# Patient Record
Sex: FEMALE | Race: WHITE | NOT HISPANIC OR LATINO | Employment: OTHER | URBAN - METROPOLITAN AREA
[De-identification: names, ages, dates, MRNs, and addresses within clinical notes are randomized per-mention and may not be internally consistent; named-entity substitution may affect disease eponyms.]

---

## 2017-12-23 ENCOUNTER — HOSPITAL ENCOUNTER (EMERGENCY)
Facility: CLINIC | Age: 57
Discharge: HOME | End: 2017-12-23
Attending: EMERGENCY MEDICINE | Admitting: EMERGENCY MEDICINE
Payer: COMMERCIAL

## 2017-12-23 VITALS
TEMPERATURE: 99 F | OXYGEN SATURATION: 99 % | BODY MASS INDEX: 29.26 KG/M2 | SYSTOLIC BLOOD PRESSURE: 148 MMHG | RESPIRATION RATE: 16 BRPM | WEIGHT: 160 LBS | DIASTOLIC BLOOD PRESSURE: 106 MMHG

## 2017-12-23 DIAGNOSIS — G56.03 BILATERAL CARPAL TUNNEL SYNDROME: ICD-10-CM

## 2017-12-23 PROCEDURE — 99283 EMERGENCY DEPT VISIT LOW MDM: CPT

## 2017-12-23 RX ORDER — METHYLPREDNISOLONE 4 MG
TABLET, DOSE PACK ORAL
Qty: 21 TABLET | Refills: 0 | Status: SHIPPED | OUTPATIENT
Start: 2017-12-23

## 2017-12-23 RX ORDER — GABAPENTIN 300 MG/1
300 CAPSULE ORAL AT BEDTIME
Qty: 15 CAPSULE | Refills: 0 | Status: SHIPPED | OUTPATIENT
Start: 2017-12-23

## 2017-12-23 ASSESSMENT — ENCOUNTER SYMPTOMS
MYALGIAS: 1
FEVER: 0

## 2017-12-23 NOTE — ED AVS SNAPSHOT
[facility] Emergency Department    [address]    [facility] [address]    Phone:  [phone]    Fax:  [phone]                                       Estella Yeager   MRN: [id number]    Department:  [facility] Emergency Department   Date of Visit:  12/23/2017           After Visit Summary Signature Page     I have received my discharge instructions, and my questions have been answered. I have discussed any challenges I see with this plan with the nurse or doctor.    ..........................................................................................................................................  Patient/Patient Representative Signature      ..........................................................................................................................................  Patient Representative Print Name and Relationship to Patient    ..................................................               ................................................  Date                                            Time    ..........................................................................................................................................  Reviewed by Signature/Title    ...................................................              ..............................................  Date                                                            Time

## 2017-12-23 NOTE — ED AVS SNAPSHOT
[facility] Emergency Department    [address] [place] [address]    Phone:  [phone]    Fax:  [phone]                                       Estella Yeager   MRN: [id number]    Department:  [facility] Emergency Department   Date of Visit:  12/23/2017           Patient Information     Date Of Birth          1960        Your diagnoses for this visit were:     Bilateral carpal tunnel syndrome        You were seen by Ron Casetllano MD.      Follow-up Information     Follow up with [facility], [place] In 3 days.    Specialty:  Family Practice        Discharge Instructions         Carpal Tunnel Syndrome Prevention Tips  Some repetitive hand activities put you at higher risk for carpal tunnel syndrome (CTS). But you can reduce your risk. Learn how to change the way you use your hands. Below are tips for at home and on the job. Be sure to also follow the hand and wrist safety policies at your workplace.      Keep your wrist in a neutral (straight) position when exercising.      Keep your wrist in neutral  Keep a neutral (straight) wrist position as often as you can. Don t use your wrist in a bent (flexed) position for long periods of time. This includes extended or twisted positions.  Watch your   Don t just use your thumb and index finger to grasp or lift. This can put stress on your wrist. When you can, use your whole hand and all its fingers to grasp an object.  Minimize repetition  Don t move your arms or hands or hold an object in the same way for long periods of time. Even simple, light tasks can cause injury this way. Instead, alternate tasks or switch hands.  Rest your hands  Give your hands a break from time to time with a rest. Even a few minutes once an hour can help.  Reduce speed and force  Slow down the speed in which you do a forceful, repetitive motion. This gives your wrist time to recover from the effort. Use power tools to help reduce the  force.  Strengthen the muscles  Weak muscles may lead to a poor wrist or arm position. Exercises will make your hand and arm muscles stronger. This can help you keep a better position.  Date Last Reviewed: 9/11/2015 2000-2017 [employer]. [address] [place] [address], [place] [address]. All rights reserved. This information is not intended as a substitute for professional medical care. Always follow your healthcare professional's instructions.          24 Hour Appointment Hotline       To make an appointment at any [facility], call [phone]). If you don't have a family doctor or clinic, we will help you find one. [facility] are conveniently located to serve the needs of you and your family.             Review of your medicines      START taking        Dose / Directions Last dose taken    gabapentin 300 MG capsule   Commonly known as:  NEURONTIN   Dose:  300 mg   Quantity:  15 capsule        Take 1 capsule (300 mg) by mouth At Bedtime   Refills:  0        methylPREDNISolone 4 MG tablet   Commonly known as:  MEDROL DOSEPAK   Quantity:  21 tablet        Follow package instructions   Refills:  0          Our records show that you are taking the medicines listed below. If these are incorrect, please call your family doctor or clinic.        Dose / Directions Last dose taken    CYMBALTA 60 MG EC capsule   Dose:  60 mg   Generic drug:  DULoxetine        Take 60 mg by mouth daily.   Refills:  0        DICLOFENAC SODIUM PO   Dose:  75 mg        Take 75 mg by mouth   Refills:  0        NORTRIPTYLINE HCL PO   Dose:  75 mg        Take 75 mg by mouth   Refills:  0                Prescriptions were sent or printed at these locations (2 Prescriptions)                   Other Prescriptions                Printed at Department/Unit printer (2 of 2)         gabapentin (NEURONTIN) 300 MG capsule               methylPREDNISolone (MEDROL DOSEPAK) 4 MG tablet                Orders Needing  Specimen Collection     None      Pending Results     No orders found from 12/21/2017 to 12/24/2017.            Pending Culture Results     No orders found from 12/21/2017 to 12/24/2017.            Pending Results Instructions     If you had any lab results that were not finalized at the time of your Discharge, you can call the ED Lab Result RN at [phone]. You will be contacted by this team for any positive Lab results or changes in treatment. The nurses are available 7 days a week from 10A to 6:30P.  You can leave a message 24 hours per day and they will return your call.        Test Results From Your Hospital Stay               Clinical Quality Measure: Blood Pressure Screening     Your blood pressure was checked while you were in the emergency department today. The last reading we obtained was  BP: (!) 148/106 . Please read the guidelines below about what these numbers mean and what you should do about them.  If your systolic blood pressure (the top number) is less than 120 and your diastolic blood pressure (the bottom number) is less than 80, then your blood pressure is normal. There is nothing more that you need to do about it.  If your systolic blood pressure (the top number) is 120-139 or your diastolic blood pressure (the bottom number) is 80-89, your blood pressure may be higher than it should be. You should have your blood pressure rechecked within a year by a primary care provider.  If your systolic blood pressure (the top number) is 140 or greater or your diastolic blood pressure (the bottom number) is 90 or greater, you may have high blood pressure. High blood pressure is treatable, but if left untreated over time it can put you at risk for heart attack, stroke, or kidney failure. You should have your blood pressure rechecked by a primary care provider within the next 4 weeks.  If your provider in the emergency department today gave you specific instructions to follow-up with your doctor or  "provider even sooner than that, you should follow that instruction and not wait for up to 4 weeks for your follow-up visit.        Thank you for choosing [facility]       Thank you for choosing [facility] for your care. Our goal is always to provide you with excellent care. Hearing back from our patients is one way we can continue to improve our services. Please take a few minutes to complete the written survey that you may receive in the mail after you visit with us. Thank you!        [employer] Information     [employer] lets you send messages to your doctor, view your test results, renew your prescriptions, schedule appointments and more. To sign up, go to [email or web address] . Click on \"Log in\" on the left side of the screen, which will take you to the Welcome page. Then click on \"Sign up Now\" on the right side of the page.     You will be asked to enter the access code listed below, as well as some personal information. Please follow the directions to create your username and password.     Your access code is: [email or web address]  Expires: 3/23/2018  6:30 PM     Your access code will  in 90 days. If you need help or a new code, please call your [facility] clinic or [phone].        Care EveryWhere ID     This is your Care EveryWhere ID. This could be used by other organizations to access your [facility] medical records  [id number]        Equal Access to Services     GRISEL FARLEY AH: Hadii cb Blankenship, waaxda lutremaineadaha, qaybta kaalmaarline mansfield, adilene carson. So [facility] [phone].    ATENCIÓN: Si habla español, tiene a mcfadden disposición servicios gratuitos de asistencia lingüística. Tc al [phone].    We comply with applicable federal civil rights laws and [place] laws. We do not discriminate on the basis of race, color, national origin, age, disability, sex, sexual orientation, or gender identity.            After Visit Summary       This is your record. Keep this with " you and show to your community pharmacist(s) and doctor(s) at your next visit.

## 2017-12-23 NOTE — ED NOTES
Pt presents with pain in bot hands 10/10 no mechanism of injury chronic pain however increased to 10/10 today

## 2017-12-24 NOTE — DISCHARGE INSTRUCTIONS
Carpal Tunnel Syndrome Prevention Tips  Some repetitive hand activities put you at higher risk for carpal tunnel syndrome (CTS). But you can reduce your risk. Learn how to change the way you use your hands. Below are tips for at home and on the job. Be sure to also follow the hand and wrist safety policies at your workplace.      Keep your wrist in a neutral (straight) position when exercising.      Keep your wrist in neutral  Keep a neutral (straight) wrist position as often as you can. Don t use your wrist in a bent (flexed) position for long periods of time. This includes extended or twisted positions.  Watch your   Don t just use your thumb and index finger to grasp or lift. This can put stress on your wrist. When you can, use your whole hand and all its fingers to grasp an object.  Minimize repetition  Don t move your arms or hands or hold an object in the same way for long periods of time. Even simple, light tasks can cause injury this way. Instead, alternate tasks or switch hands.  Rest your hands  Give your hands a break from time to time with a rest. Even a few minutes once an hour can help.  Reduce speed and force  Slow down the speed in which you do a forceful, repetitive motion. This gives your wrist time to recover from the effort. Use power tools to help reduce the force.  Strengthen the muscles  Weak muscles may lead to a poor wrist or arm position. Exercises will make your hand and arm muscles stronger. This can help you keep a better position.  Date Last Reviewed: 9/11/2015 2000-2017 [employer]. [address] [place] [address], [place] [address]. All rights reserved. This information is not intended as a substitute for professional medical care. Always follow your healthcare professional's instructions.

## 2017-12-24 NOTE — ED PROVIDER NOTES
History     Chief Complaint:  Hand Pain     HPI   Estella Yeager is a 57 year old female who presents to the emergency department today with hand pain. The patient has chronic pain that has been going on for several months and increased to 10/10 pain on both hands today. The pain goes through her palms and was described as sharp, even burning and they feel worse at night and she does wear braces. The pain does not get worse when she moves them. She was moving this weekend, which she believes has aggravated her hands today.The patient reports she has been tested for several related diseases, which could be responsible for hand pain, had a lot of hand therapy, and nothing is really working to ease her pain. The patient had carpel tunnel release surgery on both hands. The patient denies fever.     Allergies:  No Known Drug Allergies     Medications:    Cymbalta  Nortriptyline  Diclofenac sodium    Past Medical History:    Carpal Tunnel Syndrome  Depression  Self mutilating behavior    Past Surgical History:    Carpal tunnel release     Family History:    Cardiovascular    Social History:  The patient was accompanied to the ED by grandson.  Smoking Status:Never  Smokeless Tobacco: Never  Alcohol Use: No   Marital Status:  Single    Review of Systems   Constitutional: Negative for fever.   Musculoskeletal: Positive for myalgias (bilateral hand pain).   All other systems reviewed and are negative.    Physical Exam     Patient Vitals for the past 24 hrs:   BP Temp Temp src Heart Rate Resp SpO2 Weight   12/23/17 1801 - - - - - - 72.6 kg (160 lb)   12/23/17 1756 (!) 148/106 99  F (37.2  C) Oral 83 16 99 % -      Physical Exam    General: On examination, she appears happy and engaged.  HEENT:   The scalp and head appear normal    The pupils are equal, round, and reactive to light    Extraocular muscles are intact.    The nose is normal.    The oropharynx is normal.      Uvula is in the midline.  There is no peritonsillar  abscess.  Neck:  Normal range of motion.    Lungs:  Clear.      No rales, no wheezing.      There is no tachypnea.  Non-labored.  Cardiac: Regular rate.      Normal S1 and S2.      No S3 or S4.      No pathological murmur.      No pericardial rub.  Abdomen: Soft. No distension. No tympani. No rebound. Non-tender.  Lymph: No anterior or posterior cervical lymphadenopathy noted.  MS:  Normal tone.      Normal movement of all extremities.    Hands:  Can oppose thumb and index    Good strength bilaterally    No atrophy of the thenar eminences bilaterally    Paraesthesias are not made worse with taping over carpel tunnel    Scars are healed  Neuro:  Normal mentation.  No focal motor or sensory changes.      Speech normal.  Psych:  Appropriate speech.     Normal affect.     Awake.     Alert.      Normal affect.      Appropriate interactions.  Skin:  No rash.      No lesions.     Emergency Department Course     Emergency Department Course:  Nursing notes and vitals reviewed.  1808: I performed an exam of the patient as documented above.   1826: Findings and plan explained to the Patient. Patient discharged home with instructions regarding supportive care, medications, and reasons to return. The importance of close follow-up was reviewed. The patient was prescribed Neurontin and Medrol Dosepak.   I personally answered all related questions prior to discharge.     Impression & Plan      Medical Decision Making:  Estella Yeager is a 57 year old female who has had a long standing history of carpel tunnel bilaterally and pain that's been constant for many years. This is despite having carpal tunnel release bilaterally. She's been to hand clinics for physical therapy and pain clinics. She did describe the pain in the median distribution, and paraesthesias in that same area. Her exam, otherwise appears normal, she has good strength. She's never been on Neurontin, she has tried Medrol bursts and tapers, which she says has helped. We are  going to start that today as well as Neurontin, 300 mg at bed time. I'll give her two weeks worth of that. She should follow up with her primary in 72 hrs after the christmas holiday to reevaluate and decide if she wants to titrate up on the Neurontin, or if there's some other med alley she'd like to implement instead.     Diagnosis:    ICD-10-CM    1. Bilateral carpal tunnel syndrome G56.03        Disposition:  discharged to home    Discharge Medications:  Discharge Medication List as of 12/23/2017  6:33 PM      START taking these medications    Details   gabapentin (NEURONTIN) 300 MG capsule Take 1 capsule (300 mg) by mouth At Bedtime, Disp-15 capsule, R-0, Local Print      methylPREDNISolone (MEDROL DOSEPAK) 4 MG tablet Follow package instructions, Disp-21 tablet, R-0, Local Print           Scribe Disclosure:  I, Carleen Feng, am serving as a scribe at 6:08 PM on 12/23/2017 to document services personally performed by Ron Castellano MD based on my observations and the provider's statements to me.     12/23/2017   [facility] EMERGENCY DEPARTMENT       Ron Castellano MD  12/25/17 9066

## 2021-05-28 ENCOUNTER — RECORDS - HEALTHEAST (OUTPATIENT)
Dept: ADMINISTRATIVE | Facility: CLINIC | Age: 61
End: 2021-05-28

## 2021-05-29 ENCOUNTER — RECORDS - HEALTHEAST (OUTPATIENT)
Dept: ADMINISTRATIVE | Facility: CLINIC | Age: 61
End: 2021-05-29

## 2022-09-04 PROCEDURE — 99284 EMERGENCY DEPT VISIT MOD MDM: CPT

## 2022-09-04 PROCEDURE — 29505 APPLICATION LONG LEG SPLINT: CPT | Mod: LT

## 2022-09-05 ENCOUNTER — APPOINTMENT (OUTPATIENT)
Dept: GENERAL RADIOLOGY | Facility: CLINIC | Age: 62
End: 2022-09-05
Attending: EMERGENCY MEDICINE
Payer: MEDICARE

## 2022-09-05 ENCOUNTER — HOSPITAL ENCOUNTER (EMERGENCY)
Facility: CLINIC | Age: 62
Discharge: HOME | End: 2022-09-05
Attending: EMERGENCY MEDICINE
Payer: MEDICARE

## 2022-09-05 VITALS
RESPIRATION RATE: 18 BRPM | OXYGEN SATURATION: 99 % | HEART RATE: 85 BPM | DIASTOLIC BLOOD PRESSURE: 89 MMHG | SYSTOLIC BLOOD PRESSURE: 139 MMHG | TEMPERATURE: 98 F

## 2022-09-05 DIAGNOSIS — M25.562 ACUTE PAIN OF LEFT KNEE: ICD-10-CM

## 2022-09-05 PROCEDURE — 73562 X-RAY EXAM OF KNEE 3: CPT | Mod: LT

## 2022-09-05 PROCEDURE — 250N000013 HC RX MED GY IP 250 OP 250 PS 637: Performed by: EMERGENCY MEDICINE

## 2022-09-05 RX ORDER — OXYCODONE HYDROCHLORIDE 5 MG/1
5 TABLET ORAL EVERY 6 HOURS PRN
Qty: 6 TABLET | Refills: 0 | Status: SHIPPED | OUTPATIENT
Start: 2022-09-05

## 2022-09-05 RX ORDER — ASPIRIN 81 MG
100 TABLET, DELAYED RELEASE (ENTERIC COATED) ORAL DAILY
Qty: 10 TABLET | Refills: 0 | Status: SHIPPED | OUTPATIENT
Start: 2022-09-05

## 2022-09-05 RX ORDER — IBUPROFEN 600 MG/1
600 TABLET, FILM COATED ORAL ONCE
Status: COMPLETED | OUTPATIENT
Start: 2022-09-05 | End: 2022-09-05

## 2022-09-05 RX ADMIN — IBUPROFEN 600 MG: 600 TABLET ORAL at 00:51

## 2022-09-05 ASSESSMENT — ENCOUNTER SYMPTOMS
WEAKNESS: 0
NUMBNESS: 0
ARTHRALGIAS: 1

## 2022-09-05 ASSESSMENT — ACTIVITIES OF DAILY LIVING (ADL): ADLS_ACUITY_SCORE: 35

## 2022-09-05 NOTE — ED PROVIDER NOTES
History     Chief Complaint:  Knee Injury       HPI   Estella Yeager is a 61 year old female who presents with concerns for left knee injury.  Patient reports roughly 2 hours prior to arrival her 80 pound dog crashed into her left lateral knee causing her to fall to the ground onto her left knee.  She denies any head trauma or other areas of injury though does state having significant pain and feeling and instability in her knee with walking.  She denies any paresthesias, focal weakness or other symptoms.  No history of anticoagulation.  She has not taken anything for analgesia.    ROS:  Review of Systems   Musculoskeletal: Positive for arthralgias.   Neurological: Negative for weakness and numbness.   All other systems reviewed and are negative.    Allergies:  No Known Allergies     Medications:    DICLOFENAC SODIUM PO  DULoxetine (CYMBALTA) 60 MG capsule  gabapentin (NEURONTIN) 300 MG capsule  methylPREDNISolone (MEDROL DOSEPAK) 4 MG tablet  NORTRIPTYLINE HCL PO        Past Medical History:    Past Medical History:   Diagnosis Date     Carpal tunnel syndrome        Past Surgical History:    No past surgical history on file.     Family History:    family history includes Cardiovascular in her father.    Social History:   reports that she has never smoked. She has never used smokeless tobacco. She reports that she does not drink alcohol and does not use drugs.  PCP: [facility], [place]     Physical Exam     Patient Vitals for the past 24 hrs:   BP Temp Temp src Pulse Resp SpO2   09/04/22 2316 139/89 98  F (36.7  C) Temporal 83 16 99 %        Physical Exam  General: Alert. Appears comfortable  Head:  The scalp, face, and head appear normal without trauma  Eyes:  Sclera white; Pupils are equal and round  ENT:    External ears normal.      External nares normal.    Neck:  No midline tenderness or pain with full ROM.  CV:  Rate as above  Resp:  No distress  GI:  Abdomen soft  MSK:  No midline tenderness or bony  step-off    Moves all extremities equally and symmetrically other than L. Knee, decreased active ROM 2/2 to pain. Tenderness over joint line, mild soft tissue swelling, no overlying warmth/erythema  Patellar motions:  normal  Sensation: Intact to light touch distally  Cap refil:   < 2 seconds  DP/PT Pulses normal.  L. hip: full flex/ext w/o pain, no ttp.  L. ankle/foot: Able to flex/ext toes and DF/PF ankle actively. No TTP.  Special Tests:   Posterior drawer: negative  Varus stress test:positive  Valgus stress test: positive  Neuro:   No apparent deficit.    GCS: 15  Psych:  Normal affect.    Skin: abrasions to lower legs bilaterally, patient reports from prior bug bites; no surrounding warmth/erythema    Emergency Department Course     Imaging:  XR Knee Left 3 Views   Final Result   IMPRESSION: No fracture or dislocation. Minimal marginal spurring laterally.         Report per radiology      Emergency Department Course:    Reviewed:  I reviewed nursing notes, vitals, past medical history and Care Everywhere    Assessments:   I obtained history and examined the patient as noted above.     Interventions:  Medications - No data to display     Disposition:  The patient was discharged to home.     Impression & Plan        Medical Decision Making:  Patient is a 61-year-old female presenting with left knee pain after her dog crashed into her.  She is neurovascularly intact on exam.  X-ray without focal fracture or dislocation.  Concern for ligamentous injury based on her exam.  She was placed in a knee immobilizer with toe-touch weightbearing, educated on crutches.  The remainder of her trauma exam is unremarkable.  Planning close outpatient orthopedic follow-up.  Tylenol/Motrin encouraged, ice to the area and I did provide a few oxycodone with Colace for breakthrough pain and the Colace to mitigate constipation.  Return precautions given.     Diagnosis:    ICD-10-CM    1. Acute pain of left knee  M25.562          Discharge Medications:  New Prescriptions    DOCUSATE SODIUM (COLACE) 100 MG TABLET    Take 1 tablet (100 mg) by mouth daily    OXYCODONE (ROXICODONE) 5 MG TABLET    Take 1 tablet (5 mg) by mouth every 6 hours as needed for severe pain        9/5/2022   Lizeth Ambrosio, Lizeth Ortiz,   09/05/22 0044

## 2022-09-05 NOTE — ED TRIAGE NOTES
80lb dog slammed into pt left knee. Pt fell down onto knee, denies hitting head. Denies numbness or tingling. Some swelling noted to lateral and distal knee. Pt able to bear weight. Ibuprofen taken last 1830.